# Patient Record
Sex: MALE | Race: ASIAN | NOT HISPANIC OR LATINO | URBAN - METROPOLITAN AREA
[De-identification: names, ages, dates, MRNs, and addresses within clinical notes are randomized per-mention and may not be internally consistent; named-entity substitution may affect disease eponyms.]

---

## 2022-04-29 ENCOUNTER — OFFICE (OUTPATIENT)
Dept: URBAN - METROPOLITAN AREA CLINIC 19 | Facility: CLINIC | Age: 47
End: 2022-04-29

## 2022-04-29 VITALS
WEIGHT: 147 LBS | SYSTOLIC BLOOD PRESSURE: 112 MMHG | DIASTOLIC BLOOD PRESSURE: 84 MMHG | HEIGHT: 67 IN | HEART RATE: 70 BPM | OXYGEN SATURATION: 100 %

## 2022-04-29 DIAGNOSIS — R19.4 CHANGE IN BOWEL HABIT: ICD-10-CM

## 2022-04-29 DIAGNOSIS — K62.5 HEMORRHAGE OF ANUS AND RECTUM: ICD-10-CM

## 2022-04-29 DIAGNOSIS — K59.00 CONSTIPATION, UNSPECIFIED: ICD-10-CM

## 2022-04-29 PROCEDURE — 99203 OFFICE O/P NEW LOW 30 MIN: CPT

## 2022-04-29 RX ORDER — HYDROCORTISONE ACETATE 25 MG/1
SUPPOSITORY RECTAL
Qty: 10 | Refills: 1 | Status: ACTIVE
Start: 2022-04-29

## 2022-04-29 NOTE — SERVICENOTES
The patient's assessment was reviewed with Dr. Kang and a collaborative plan of care was established.

## 2022-04-29 NOTE — SERVICEHPINOTES
47-year-old  male here for complaints of occasional rectal bleeding, symptomatic hemorrhoids and incomplete emptying.  He does have history of chronic constipation for which she takes Metamucil 3 times a day.  He does typically spend a good bit of time on the toilet bowl with bowel movements.  He does have occasional rectal bleeding with bowel movements.  He did have a colonoscopy around 2 years ago with Pedro Hawkins MD.  It was reportedly unremarkable.  Colonoscopy prior to this apparently had several polyps removed (records requested ).  He denies reflux, heartburn, nausea, vomiting, dysphagia or abdominal pain.  His mother had colon polyps older than age 60.